# Patient Record
Sex: MALE | Race: WHITE
[De-identification: names, ages, dates, MRNs, and addresses within clinical notes are randomized per-mention and may not be internally consistent; named-entity substitution may affect disease eponyms.]

---

## 2022-03-04 PROBLEM — Z00.00 ENCOUNTER FOR PREVENTIVE HEALTH EXAMINATION: Status: ACTIVE | Noted: 2022-03-04

## 2022-03-08 ENCOUNTER — NON-APPOINTMENT (OUTPATIENT)
Age: 47
End: 2022-03-08

## 2022-03-08 ENCOUNTER — APPOINTMENT (OUTPATIENT)
Dept: UROLOGY | Facility: CLINIC | Age: 47
End: 2022-03-08
Payer: COMMERCIAL

## 2022-03-08 VITALS
SYSTOLIC BLOOD PRESSURE: 123 MMHG | TEMPERATURE: 98.3 F | HEART RATE: 74 BPM | BODY MASS INDEX: 27.3 KG/M2 | WEIGHT: 195 LBS | HEIGHT: 71 IN | DIASTOLIC BLOOD PRESSURE: 83 MMHG

## 2022-03-08 PROCEDURE — 99204 OFFICE O/P NEW MOD 45 MIN: CPT

## 2022-03-08 NOTE — LETTER BODY
[Dear  ___] : Dear  [unfilled], [FreeTextEntry2] : Philipp Haque MD\par [facility]\par [address]\[place] [address] [FreeTextEntry1] : I had the pleasure of seeing ANA RAZA, a 46 year old man,  to your patient Jennifer Raza, in the office in consultation today. Please see the attached note for full details.\par \par Thank you very much for allowing me to participate in the care of this patient. If you have any questions please feel free to call me at any time. \par \par \par Sincerely yours,\par \par \par \par Emmanuel Luke MD, CECILIO\par Director, Male Fertility and Microsurgery\par  of Urology\par [facility]\par

## 2022-03-08 NOTE — ASSESSMENT
[FreeTextEntry1] : hypogonadsim\par recurrent IVF failures\par reported elevated DFI\par advised to bring all reports\par TT FSH LH E2\par bilateral varicocleles - i am heistant to correct this at this time\par The significance of the elevation in the DNA fragmentation index in light of the failure of multiple IVF attempts was discussed at length. He understands that there is some evidence suggesting that marked elevations in DFI can also lead to difficulties with IVF success. Variables affecting DFI, including dietary changes, infection and varicocele were discussed. He understands that there is reasonable evidence suggesting improvements in DFI following varicocele repair, which may potentially lead to improvements in IVF success rates and even potentially natural conception. In addition, we spoke about the use of testicular sperm in IVF in patients with elevated DFI and IVF failures. He understands that DFI testing in testicular sperm may show improvements, and that , anecdotally, there are patients who have success with IVF using testicular sperm despite multiple previous failures with ejaculated sperm.\par lifestyle mod\par considering tese\par will f/u 1 week

## 2022-03-08 NOTE — PHYSICAL EXAM
[Urethral Meatus] : meatus normal [Penis Abnormality] : normal circumcised penis [FreeTextEntry1] : grade I bilateral varicocele. 18cc firm testes.

## 2022-03-08 NOTE — HISTORY OF PRESENT ILLNESS
[FreeTextEntry1] : 46M  Jennifer Raza (39F) comes in with failure to conceive. one 2 year old daughter together via IVF. since daughter's birth - \par 6 months failure to conceive naturally\par initiated IVF 1 year ago - Randy\par 1) march 2021 2 embryos frozen\par 2) 2 embryos - 1 failed ET\par 3) 2 embryos - 1 ET with biochemical 1 embryo frozen\par \par 4) [facility] - 5 eggs, 1 embryo, abnl PGD\par no male factor eval\par reports poor motility noted on SA\par DNA fragmentation - reportedly abnormal (no report available today)\par \par currently undergoing stimulation with another retrieval by end of month.

## 2022-03-14 LAB
ESTRADIOL SERPL-MCNC: 17 PG/ML
FSH SERPL-MCNC: 2.3 IU/L
LH SERPL-ACNC: 2.7 IU/L
TESTOST FREE SERPL-MCNC: 10.8 PG/ML
TESTOST SERPL-MCNC: 193 NG/DL

## 2022-03-15 ENCOUNTER — APPOINTMENT (OUTPATIENT)
Dept: UROLOGY | Facility: CLINIC | Age: 47
End: 2022-03-15
Payer: COMMERCIAL

## 2022-03-15 VITALS — DIASTOLIC BLOOD PRESSURE: 78 MMHG | SYSTOLIC BLOOD PRESSURE: 129 MMHG | HEART RATE: 76 BPM | TEMPERATURE: 98 F

## 2022-03-15 PROCEDURE — 99214 OFFICE O/P EST MOD 30 MIN: CPT

## 2022-03-15 NOTE — LETTER BODY
[Dear  ___] : Dear  [unfilled], [FreeTextEntry2] : Philipp Haque MD\par [facility]\par [address]\[place] [address] [FreeTextEntry1] : I had the pleasure of seeing ANA RAZA, a 46 year old man,  to your patient Jennifer Raza, in the office in consultation today. Please see the attached note for full details.\par \par Thank you very much for allowing me to participate in the care of this patient. If you have any questions please feel free to call me at any time. \par \par \par Sincerely yours,\par \par \par \par Emmanuel Luke MD, CECILIO\par Director, Male Fertility and Microsurgery\par  of Urology\par [facility]

## 2022-03-15 NOTE — ASSESSMENT
[FreeTextEntry1] : hypoognadism\par elevated DFI\par robust total motile count\par The significance of the elevation in the DNA fragmentation index in light of the failure of multiple IVF attempts was discussed at length. He understands that there is some evidence suggesting that marked elevations in DFI can also lead to difficulties with IVF success. Variables affecting DFI, including dietary changes, infection and varicocele were discussed. He understands that there is reasonable evidence suggesting improvements in DFI following varicocele repair, which may potentially lead to improvements in IVF success rates and even potentially natural conception. In addition, we spoke about the use of testicular sperm in IVF in patients with elevated DFI and IVF failures. He understands that DFI testing in testicular sperm may show improvements, and that , anecdotally, there are patients who have success with IVF using testicular sperm despite multiple previous failures with ejaculated sperm.\par I recommend proceeding with next IVF attempt using ejacualted sperm\par if fails for TESE with next IVF\par repeat labs in 4 weeks\par f/u 6 weeks\par

## 2022-03-15 NOTE — HISTORY OF PRESENT ILLNESS
[FreeTextEntry1] : returns for follow up\par  at baseline \par due to start clomid \par 5.4 ml/102 mil/ml/49% motility/3% morphology\par Total motile count - 270 million\par DFI: \par SCSA 30% (elevated)

## 2022-05-03 ENCOUNTER — APPOINTMENT (OUTPATIENT)
Dept: UROLOGY | Facility: CLINIC | Age: 47
End: 2022-05-03
Payer: COMMERCIAL

## 2022-05-03 VITALS
WEIGHT: 195 LBS | HEART RATE: 73 BPM | BODY MASS INDEX: 27.3 KG/M2 | SYSTOLIC BLOOD PRESSURE: 112 MMHG | DIASTOLIC BLOOD PRESSURE: 71 MMHG | TEMPERATURE: 98.4 F | HEIGHT: 71 IN

## 2022-05-03 PROCEDURE — 99214 OFFICE O/P EST MOD 30 MIN: CPT

## 2022-05-03 NOTE — ASSESSMENT
[FreeTextEntry1] : hypogonadism\par recurrent IVF failures\par low baselien TT\par did not start clomid\par given the markedly low TT levels it is possible that this is playing a role in abnormal DFI testing\par reiterated this to patient\par reitereated potential role of TESE if it is felt the last IVF failure was somehow sperm related\par I suggest starting clomid and repeat SA in 3 motnhs with DFI\par will check TT FSH LH E2 CBC CMP in 4 weeks\par f/u after\par

## 2022-05-03 NOTE — LETTER BODY
[Dear  ___] : Dear  [unfilled], [FreeTextEntry2] : Philipp Haque MD\par [facility]\par [address]\[place] [address] [FreeTextEntry1] : I had the pleasure of seeing ANA RAZA, a 46 year old man,  to your patient Jennifer Raza, in the office in consultation today. Please see the attached note for full details.\par \par Thank you very much for allowing me to participate in the care of this patient. If you have any questions please feel free to call me at any time. \par \par \par Sincerely yours,\par \par \par \par Emmanuel Luke MD, CECILIO\par Director, Male Fertility and Microsurgery\par  of Urology\par [facility].

## 2022-05-03 NOTE — HISTORY OF PRESENT ILLNESS
[FreeTextEntry1] : returns for followup\par repeat failed IVF attempt\par 1 egg retrieved\par due for repeat attempt in June\par ?egg quality issue\par SCSA 30% elevated\par repeat labs:\par  (not on clomid)\par LH 4.9\par FSH 2.2\par did not start clomid for unclear reasons\par

## 2022-05-10 ENCOUNTER — NON-APPOINTMENT (OUTPATIENT)
Age: 47
End: 2022-05-10

## 2022-06-21 ENCOUNTER — APPOINTMENT (OUTPATIENT)
Dept: UROLOGY | Facility: CLINIC | Age: 47
End: 2022-06-21
Payer: COMMERCIAL

## 2022-06-21 VITALS — TEMPERATURE: 98 F | SYSTOLIC BLOOD PRESSURE: 118 MMHG | DIASTOLIC BLOOD PRESSURE: 75 MMHG | HEART RATE: 81 BPM

## 2022-06-21 PROCEDURE — 99213 OFFICE O/P EST LOW 20 MIN: CPT

## 2022-06-21 RX ORDER — CLOMIPHENE CITRATE 50 MG/1
50 TABLET ORAL
Qty: 30 | Refills: 3 | Status: ACTIVE | COMMUNITY
Start: 2022-03-14 | End: 1900-01-01

## 2022-06-21 NOTE — HISTORY OF PRESENT ILLNESS
[FreeTextEntry1] : returns for follow up\par now on clomid\par  at baseline now at \par HCT 45.4\par normal LFTs\par LH 5.7\par FSH 3.2\par E2 29\par marked improvement in symptoms

## 2022-06-21 NOTE — LETTER BODY
[Dear  ___] : Dear  [unfilled], [FreeTextEntry2] : Philipp Haque MD\par [facility]\par [address]\[place] [address] [FreeTextEntry1] : I had the pleasure of seeing ANA RAZA, a 46 year old man,  to your patient Jennifer Raza, in the office in consultation today. Please see the attached note for full details.\par \par Thank you very much for allowing me to participate in the care of this patient. If you have any questions please feel free to call me at any time. \par \par \par Sincerely yours,\par \par \par \par Emmanuel Luke MD, CECILIO\par Director, Male Fertility and Microsurgery\par  of Urology\par [facility] [place] [facility].

## 2022-06-21 NOTE — ASSESSMENT
[FreeTextEntry1] : hypogonadism\par due for repeat IVF cycle\par continue clmoid repeat SA in 3 months \par if failure of IVF for repeat DFI testing and consider TESE\par otherwise follow up 3 months\par marked symptomatic improvement

## 2022-09-20 ENCOUNTER — APPOINTMENT (OUTPATIENT)
Dept: UROLOGY | Facility: CLINIC | Age: 47
End: 2022-09-20

## 2022-09-20 VITALS — DIASTOLIC BLOOD PRESSURE: 71 MMHG | HEART RATE: 67 BPM | TEMPERATURE: 98.2 F | SYSTOLIC BLOOD PRESSURE: 114 MMHG

## 2022-09-20 DIAGNOSIS — E29.1 TESTICULAR HYPOFUNCTION: ICD-10-CM

## 2022-09-20 PROCEDURE — 99214 OFFICE O/P EST MOD 30 MIN: CPT

## 2022-09-20 NOTE — ASSESSMENT
[FreeTextEntry1] : hypogonadsim\par he wants a medication holiday\par hold clomid\par repeat labs in 4-6 weeks \par review by phone\par if low T restart clomid \par f/u 6 months

## 2022-09-20 NOTE — HISTORY OF PRESENT ILLNESS
[FreeTextEntry1] : hypogoandism now on clomid\par wife is now 9 weeks pregnant s/p IVF\par \par remains on clomid\par now seeing nutrtitionist\par baseline \par  in June\par \par